# Patient Record
Sex: MALE | Race: WHITE | NOT HISPANIC OR LATINO | Employment: FULL TIME | URBAN - METROPOLITAN AREA
[De-identification: names, ages, dates, MRNs, and addresses within clinical notes are randomized per-mention and may not be internally consistent; named-entity substitution may affect disease eponyms.]

---

## 2018-02-12 ENCOUNTER — OFFICE VISIT CONVERTED (OUTPATIENT)
Dept: INTERNAL MEDICINE | Facility: CLINIC | Age: 23
End: 2018-02-12
Attending: INTERNAL MEDICINE

## 2020-03-18 ENCOUNTER — CONVERSION ENCOUNTER (OUTPATIENT)
Dept: INTERNAL MEDICINE | Facility: CLINIC | Age: 25
End: 2020-03-18

## 2020-03-18 ENCOUNTER — OFFICE VISIT CONVERTED (OUTPATIENT)
Dept: INTERNAL MEDICINE | Facility: CLINIC | Age: 25
End: 2020-03-18
Attending: INTERNAL MEDICINE

## 2021-03-17 ENCOUNTER — CONVERSION ENCOUNTER (OUTPATIENT)
Dept: INTERNAL MEDICINE | Facility: CLINIC | Age: 26
End: 2021-03-17

## 2021-03-17 ENCOUNTER — OFFICE VISIT CONVERTED (OUTPATIENT)
Dept: INTERNAL MEDICINE | Facility: CLINIC | Age: 26
End: 2021-03-17
Attending: PHYSICIAN ASSISTANT

## 2021-05-14 VITALS
DIASTOLIC BLOOD PRESSURE: 68 MMHG | HEIGHT: 69 IN | OXYGEN SATURATION: 98 % | WEIGHT: 162 LBS | TEMPERATURE: 97.6 F | BODY MASS INDEX: 23.99 KG/M2 | SYSTOLIC BLOOD PRESSURE: 118 MMHG | HEART RATE: 82 BPM

## 2021-05-14 NOTE — PROGRESS NOTES
"   Progress Note      Patient Name: Jatin Waller   Patient ID: [id number]   Sex: Male   YOB: 1995    Primary Care Provider: Delilah Quinones MD   Referring Provider: Maliha Hinton MD    Visit Date: March 17, 2021    Provider: Yari Haas PA-C   Location: [facility]   Location Address: [address] [place] [address], [address]  [id number]   Location Phone: [phone]          Chief Complaint  · Acute  · \"itchy,rash for 2 months\"      History Of Present Illness  Jatin Waller is a 25 year old /White male who presents for evaluation and treatment of:      Itchy rash x 2 months.  Has been using antifungal cream without improvement.  Area is under L arm pit and on L thigh area.  He has had this problem before and it improved with some rash cream in the past.  He does admit to a history of eczema.       Past Medical History  Disease Name Date Onset Notes   Allergic rhinitis --  --    Asthma --  --    Nut allergy 02/12/2018 Will send refill for EpiPen.         Past Surgical History  Procedure Name Date Notes   Knee surgery 2013 torn meniiscus         Medication List  Name Date Started Instructions   EpiPen 2-Malachi 0.3 mg/0.3 mL injection auto-injector 03/18/2020 INJECT INTO THE MIDDLE OF THE OUTER THIGH AND HOLD FOR 10 SECONDS AS NEEDED FOR ANAPHYLAXIS   Ventolin HFA 90 mcg/actuation inhalation HFA aerosol inhaler 03/12/2020 inhale 1 puff (90 mcg) by inhalation route every 6 hours as needed         Allergy List  Allergen Name Date Reaction Notes   Motrin --  --  --          Family Medical History  Disease Name Relative/Age Notes   Pancreatic Neoplasm, Malignant Grandfather (maternal)/   --    Stroke Grandfather (maternal)/   --    Heart Disease Grandmother (paternal)/   --    Diabetes Grandfather (maternal)/   --          Social History  Finding Status Start/Stop Quantity Notes   Alcohol Never --/-- --  --    Exercises regularly --  --/-- --  runs and lifts weights 1-3 a " "week   Tobacco Never --/-- --  --          Immunizations  NameDate Admin Mfg Trade Name Lot Number Route Inj VIS Given VIS Publication   [id number]/14/2015 SKB Fluarix, quadrivalent, preservative free [id number] IM LD 10/14/2015 08/19/2014   Comments: tolerated well; left office in stable condition         Review of Systems  · Constitutional  o Denies  o : fever, fatigue, weight loss, weight gain  · Cardiovascular  o Denies  o : lower extremity edema, claudication, chest pressure, palpitations  · Respiratory  o Denies  o : shortness of breath, wheezing, cough, hemoptysis, dyspnea on exertion  · Gastrointestinal  o Denies  o : nausea, vomiting, diarrhea, constipation, abdominal pain  · Integument  o Admits  o : rash      Vitals  Date Time BP Position Site L\R Cuff Size HR RR TEMP (F) WT  HT  BMI kg/m2 BSA m2 O2 Sat FR L/min FiO2 HC       03/18/2020 10:08 /78 Sitting    86 - R  97.8 160lbs 4oz 5'  9.5\" 23.33 1.89 100 %      03/17/2021 01:04 /68 Sitting    82 - R  97.6 162lbs 0oz 5'  9\" 23.92 1.89 98 %            Physical Examination  · Constitutional  o Appearance  o : no acute distress, well-nourished  · Head and Face  o Head  o :   § Inspection  § : atraumatic, normocephalic  · Eyes  o Eyes  o : extraocular movements intact, no scleral icterus, no conjunctival injection  · Ears, Nose, Mouth and Throat  o Ears  o :   § External Ears  § : normal  o Nose  o :   § Intranasal Exam  § : nares patent  o Oral Cavity  o :   § Oral Mucosa  § : moist mucous membranes  · Respiratory  o Respiratory Effort  o : breathing comfortably, symmetric chest rise  o Auscultation of Lungs  o : clear to asculatation bilaterally, no wheezes, rales, or rhonchii  · Cardiovascular  o Heart  o :   § Auscultation of Heart  § : regular rate and rhythm, no murmurs, rubs, or gallops  o Peripheral Vascular System  o :   § Extremities  § : no edema  · Skin and Subcutaneous Tissue  o General Inspection  o : erythematous patch posterior to L " axilla without crusting   · Neurologic  o Mental Status Examination  o :   § Orientation  § : grossly oriented to person, place and time  o Gait and Station  o :   § Gait Screening  § : normal gait  · Psychiatric  o General  o : normal mood and affect          Assessment  · Eczema     692.9/L30.9  Will send in steroid cream. If symptoms persist or worsen could consider dermatology referral.      Plan  · Orders  o ACO-39: Current medications updated and reviewed (1159F, ) - - 03/17/2021  · Medications  o triamcinolone acetonide 0.1 % topical cream   SIG: apply a thin layer to the affected area(s) by topical route 2 times per day   DISP: (30) Gram with 0 refills  Prescribed on 03/17/2021     o Medications have been Reconciled  o Transition of Care or Provider Policy  · Instructions  o Take all medications as prescribed/directed.  o Patient was educated/instructed on their diagnosis, treatment and medications prior to discharge from the clinic today.  o Patient instructed to seek medical attention urgently for new or worsening symptoms.  o Call the office with any concerns or questions.  · Disposition  o Call or Return if symptoms worsen or persist.  o follow up as needed  o Medications sent electronically to pharmacy            Electronically Signed by: Yari Haas PA-C -Author on March 30, 2021 08:02:43 AM

## 2021-05-15 VITALS
TEMPERATURE: 97.8 F | OXYGEN SATURATION: 100 % | HEART RATE: 86 BPM | WEIGHT: 160.25 LBS | SYSTOLIC BLOOD PRESSURE: 120 MMHG | HEIGHT: 69 IN | DIASTOLIC BLOOD PRESSURE: 78 MMHG | BODY MASS INDEX: 23.74 KG/M2

## 2021-05-16 VITALS
BODY MASS INDEX: 22.11 KG/M2 | HEIGHT: 69 IN | OXYGEN SATURATION: 99 % | RESPIRATION RATE: 16 BRPM | WEIGHT: 149.25 LBS | DIASTOLIC BLOOD PRESSURE: 76 MMHG | SYSTOLIC BLOOD PRESSURE: 112 MMHG | TEMPERATURE: 98.7 F | HEART RATE: 120 BPM